# Patient Record
Sex: MALE | Race: WHITE | NOT HISPANIC OR LATINO | Employment: OTHER | URBAN - METROPOLITAN AREA
[De-identification: names, ages, dates, MRNs, and addresses within clinical notes are randomized per-mention and may not be internally consistent; named-entity substitution may affect disease eponyms.]

---

## 2018-11-27 ENCOUNTER — TRANSCRIBE ORDERS (OUTPATIENT)
Dept: ADMINISTRATIVE | Facility: HOSPITAL | Age: 63
End: 2018-11-27

## 2018-11-27 DIAGNOSIS — Z12.2 ENCOUNTER FOR SCREENING FOR LUNG CANCER: Primary | ICD-10-CM

## 2018-12-04 ENCOUNTER — HOSPITAL ENCOUNTER (OUTPATIENT)
Dept: CT IMAGING | Facility: HOSPITAL | Age: 63
Discharge: HOME | End: 2018-12-04
Admitting: NURSE PRACTITIONER

## 2018-12-04 DIAGNOSIS — Z12.2 ENCOUNTER FOR SCREENING FOR LUNG CANCER: ICD-10-CM

## 2018-12-04 PROCEDURE — G0297 LDCT FOR LUNG CA SCREEN: HCPCS

## 2020-03-16 ENCOUNTER — TELEPHONE (OUTPATIENT)
Dept: NEUROSURGERY | Facility: CLINIC | Age: 65
End: 2020-03-16

## 2020-05-04 ENCOUNTER — TELEPHONE (OUTPATIENT)
Dept: NEUROSURGERY | Facility: CLINIC | Age: 65
End: 2020-05-04

## 2020-05-04 NOTE — TELEPHONE ENCOUNTER
I called and M for patient to see if he would be interested in coming in on 05/8/20 to see Dr. Cedeno.

## 2020-05-08 ENCOUNTER — OFFICE VISIT (OUTPATIENT)
Dept: NEUROSURGERY | Facility: CLINIC | Age: 65
End: 2020-05-08

## 2020-05-08 VITALS
DIASTOLIC BLOOD PRESSURE: 86 MMHG | TEMPERATURE: 98.2 F | HEIGHT: 74 IN | WEIGHT: 254 LBS | SYSTOLIC BLOOD PRESSURE: 141 MMHG | BODY MASS INDEX: 32.6 KG/M2 | HEART RATE: 89 BPM

## 2020-05-08 DIAGNOSIS — M47.812 ARTHROPATHY OF CERVICAL FACET JOINT: ICD-10-CM

## 2020-05-08 DIAGNOSIS — M54.2 CHRONIC NECK PAIN: Primary | ICD-10-CM

## 2020-05-08 DIAGNOSIS — M50.30 DDD (DEGENERATIVE DISC DISEASE), CERVICAL: ICD-10-CM

## 2020-05-08 DIAGNOSIS — G89.29 CHRONIC NECK PAIN: Primary | ICD-10-CM

## 2020-05-08 PROCEDURE — 99204 OFFICE O/P NEW MOD 45 MIN: CPT | Performed by: NEUROLOGICAL SURGERY

## 2020-05-08 NOTE — PROGRESS NOTES
Subjective   History of Present Illness: Layton Truong is a 64 y.o. male is being seen for consultation today at the request of Funmi Sethi for intermittent neck pain. He denies arm pain and weakness. He reports pressure in the posterior area of his neck. He is unsure of any numbness and tingling.  He has no weakness in the upper or lower extremities denies balance problems.  He denies any conservative treatment.  He denies bowel or bladder complaints.    After we were done evaluating his neck pain which is his primary concern his wife mentioned he was supposed to come here for the dizziness.  I am not a specialist in dizziness and that neck problems do not serve as primary cause of dizziness as it is generally within the field of otolaryngology or medical neurology or rarely cardiology.    While in the room and during my examination of the patient I wore a mask and eye protection.  I washed my hands before and after this patient encounter.  The patient was also wearing a mask.    Neck Pain    The current episode started more than 1 month ago. The problem occurs intermittently. The problem has been gradually worsening. The pain is present in the left side, midline and right side. The pain is at a severity of 0/10. The symptoms are aggravated by position. The pain is same all the time. Associated symptoms include headaches. Pertinent negatives include no chest pain, fever, pain with swallowing, trouble swallowing, visual change or weakness.       The following portions of the patient's history were reviewed and updated as appropriate: allergies, current medications, past family history, past medical history, past social history, past surgical history and problem list.    Review of Systems   Constitutional: Positive for activity change. Negative for fever.   HENT: Positive for congestion. Negative for trouble swallowing.    Eyes: Negative.  Negative for visual disturbance.   Respiratory: Negative.  Negative  "for chest tightness and shortness of breath.    Cardiovascular: Negative.  Negative for chest pain.   Gastrointestinal: Negative.  Negative for diarrhea, nausea and vomiting.   Endocrine: Negative.  Negative for cold intolerance and heat intolerance.   Genitourinary: Negative.  Negative for difficulty urinating.   Musculoskeletal: Positive for neck pain.   Skin: Negative.    Allergic/Immunologic: Negative.  Negative for environmental allergies.   Neurological: Positive for headaches. Negative for weakness.   Hematological: Negative.  Does not bruise/bleed easily.   Psychiatric/Behavioral: Negative.        Objective     Vitals:    05/08/20 1121   BP: 141/86   Pulse: 89   Temp: 98.2 °F (36.8 °C)   Weight: 115 kg (254 lb)   Height: 188 cm (74\")     Body mass index is 32.61 kg/m².      Physical Exam  Neurologic Exam     Physical Exam:    CONSTITUTIONAL: This 64 year old right handed  male appears well developed, well-nourished and in no acute distress.    HEAD & FACE: the head and face are symmetric, normocephalic and atraumatic.    EYES: Inspection of the conjunctivae and lids reveals no swelling, erythema or discharge.  Pupils are round, equal and reactive to light and there is no scleral icterus.    EARS, NOSE, MOUTH & THROAT: On inspection, the ears and nose are within normal limits.    NECK: the neck is supple and symmetric. The trachea is midline with no masses.    PULMONARY: Respiratory effort is normal with no increased work of breathing or signs of respiratory distress.    CARDIOVASCULAR: Pedal pulses are +2/4 bilaterally. Examination of the extremities shows no edema or varicosities.    LYMPHATIC: There is no palpable lymphadenopathy of the neck.    MUSCULOSKELETAL: Gait and station are slow to mobilize but he has a normal station and gait. The spine has normal alignment and range of motion, is nontender to palpation of the cervical spine    SKIN: The skin is warm, dry and intact    NEUROLOGIC: "   Cranial Nerves 2-12 intact  Normal motor strength noted. Muscle bulk and tone are normal.  Sensory exam is normal to all modalities.  Reflexes on the right side demonstrates 0/4 Triceps Reflex, 1/4 Biceps Reflex, 1/4 Brachioradialis Reflex, 1/4 Knee Jerk Reflex, 0/4 Ankle Jerk Reflex and no ankle clonus on the right.   Reflexes on the left side demonstrates 0/4 Triceps Reflex, 1/4 Biceps Reflex, 1/4 Brachioradialis Reflex, 1/4 Knee Jerk Reflex, 0/4 Ankle Jerk Reflex and no ankle clonus on the left.  Superficial/Primitive Reflexes: primitive reflexes were absent.  Jameel's, Babinski and clonus are all negative  No coordination deficit observed.  Radicular testing showed a negative Jon (LISBETH) test and negative straight leg raise.  Spurling's maneuver is negative  Cortical function is intact and without deficits. Speech is normal.    PSYCHIATRIC: oriented to person, place and time. Patient's mood and affect are normal.      Assessment/Plan   Independent Review of Radiographic Studies:     I personally reviewed the images from the following studies.    MRI of the cervical spine done at the [facility] believe around February 10, 2020 that is the date the scan was reported reveals multilevel degenerative change between C4-C7 with neuroforaminal narrowing bilaterally at those levels but only borderline narrowing of the central canal.  No severe central stenosis or cord compression is identified.  In general the neuroforaminal narrowing is more significant to the right than the left throughout those levels mentioned.    Plain films of the cervical spine done on February 11, 2020 reveal spondylosis particularly between C5-C6 and C6-C7 with some reversal of the normal cervical lordosis.  No fractures but there is slight anterolisthesis of C3C4 and C4/C5 within physiologically allowable limits.    Medical Decision Making:      From the neurosurgical perspective there is no surgical option for isolated neck  pain. There is no persistent radiculopathy, myelopathy or loss of nerve function on exam to justify surgery. Generally most people respond favorably to physical therapy, NSAIDs or oral steroids when appropriate. In difficult situations, pain management such as cervical epidural steroids or facet rhizotomies are an option.     Since surgical pathology was not identified radiographically or clinically, we will initiate conservative treatment and only plan on follow up as needed if surgical questions arise.    Return if symptoms worsen or fail to improve.    Layton was seen today for neck pain.    Diagnoses and all orders for this visit:    Chronic neck pain  -     Ambulatory Referral to Physical Therapy Evaluate and treat  -     Ambulatory Referral to Pain Management    DDD (degenerative disc disease), cervical  -     Ambulatory Referral to Physical Therapy Evaluate and treat  -     Ambulatory Referral to Pain Management    Arthropathy of cervical facet joint  -     Ambulatory Referral to Physical Therapy Evaluate and treat  -     Ambulatory Referral to Pain Management             Dennis Cedeno MD FACS [facility]  Neurological Surgery